# Patient Record
Sex: MALE | Race: BLACK OR AFRICAN AMERICAN | URBAN - METROPOLITAN AREA
[De-identification: names, ages, dates, MRNs, and addresses within clinical notes are randomized per-mention and may not be internally consistent; named-entity substitution may affect disease eponyms.]

---

## 2021-05-30 ENCOUNTER — EMERGENCY (EMERGENCY)
Facility: HOSPITAL | Age: 56
LOS: 1 days | Discharge: HOME | End: 2021-05-30
Attending: EMERGENCY MEDICINE
Payer: COMMERCIAL

## 2021-05-30 VITALS
SYSTOLIC BLOOD PRESSURE: 132 MMHG | HEART RATE: 115 BPM | TEMPERATURE: 99 F | RESPIRATION RATE: 18 BRPM | OXYGEN SATURATION: 96 % | HEIGHT: 67 IN | DIASTOLIC BLOOD PRESSURE: 86 MMHG | WEIGHT: 209.44 LBS

## 2021-05-30 VITALS — HEART RATE: 93 BPM

## 2021-05-30 PROCEDURE — 96372 THER/PROPH/DIAG INJ SC/IM: CPT

## 2021-05-30 PROCEDURE — 99284 EMERGENCY DEPT VISIT MOD MDM: CPT

## 2021-05-30 PROCEDURE — 99283 EMERGENCY DEPT VISIT LOW MDM: CPT | Mod: 25

## 2021-05-30 RX ORDER — FAMOTIDINE 10 MG/ML
1 INJECTION INTRAVENOUS
Qty: 28 | Refills: 0
Start: 2021-05-30 | End: 2021-06-12

## 2021-05-30 RX ORDER — FAMOTIDINE 10 MG/ML
20 INJECTION INTRAVENOUS ONCE
Refills: 0 | Status: COMPLETED | OUTPATIENT
Start: 2021-05-30 | End: 2021-05-30

## 2021-05-30 RX ORDER — DEXAMETHASONE 0.5 MG/5ML
10 ELIXIR ORAL ONCE
Refills: 0 | Status: COMPLETED | OUTPATIENT
Start: 2021-05-30 | End: 2021-05-30

## 2021-05-30 RX ORDER — DIPHENHYDRAMINE HCL 50 MG
50 CAPSULE ORAL ONCE
Refills: 0 | Status: COMPLETED | OUTPATIENT
Start: 2021-05-30 | End: 2021-05-30

## 2021-05-30 RX ORDER — DIPHENHYDRAMINE HCL 50 MG
1 CAPSULE ORAL
Qty: 28 | Refills: 0
Start: 2021-05-30 | End: 2021-06-05

## 2021-05-30 RX ADMIN — FAMOTIDINE 20 MILLIGRAM(S): 10 INJECTION INTRAVENOUS at 16:09

## 2021-05-30 RX ADMIN — Medication 10 MILLIGRAM(S): at 16:09

## 2021-05-30 RX ADMIN — Medication 50 MILLIGRAM(S): at 16:09

## 2021-05-30 NOTE — ED PROVIDER NOTE - PATIENT PORTAL LINK FT
You can access the FollowMyHealth Patient Portal offered by [facility] by registering at the following website: [email or web address]. By joining [employer]’s FollowMyHealth portal, you will also be able to view your health information using other applications (apps) compatible with our system.

## 2021-05-30 NOTE — ED PROVIDER NOTE - OBJECTIVE STATEMENT
55 year old male with no significant PMHx or PSHx presents to the ED with complaints of hives for three days after having red and green grapes. Patient reports that he noted initial improvement with taking Benadryl, however states that the hives then return. Patient denies any shortness of breath, fever, chills, wheezing, and all other acute complaints. NKDA.

## 2021-05-30 NOTE — ED ADULT NURSE NOTE - CHIEF COMPLAINT QUOTE
Pt came in the er with c/o itching ,redness .coughing since Thursday .no Tongue swelling .no sob .heart rate is 115.

## 2021-05-30 NOTE — ED ADULT TRIAGE NOTE - CHIEF COMPLAINT QUOTE
Pt came in the er with c/o itching ,redness .coughing since Thursday .no lounge swelling .no sob .hert rate is 115. Pt came in the er with c/o itching ,redness .coughing since Thursday .no Tongue swelling .no sob .heart rate is 115.

## 2021-05-30 NOTE — ED ADULT NURSE NOTE - EXTENSIONS OF SELF_ADULT
Refill Routing Note     Medication(s) are not appropriate for processing by [facility]:    Medication Outside of Protocol    Appointments  past 12m or future 3m with PCP    Date Provider   Last Visit   10/22/2019 Josh Li MD   Next Visit   4/20/2020 Josh Li MD           Automatic Epic Protocol Generated Data:    Requested Prescriptions   Pending Prescriptions Disp Refills    ALPRAZolam (XANAX) 0.25 MG tablet [Pharmacy Med Name: ALPRAZOLAM 0.25 MG TAB 0.25 TAB] 60 tablet 2     Sig: TAKE 1 TABLET BY MOUTH 3 TIMES DAILY AS NEEDED       There is no refill protocol information for this order           Note composed:8:13 AM 02/24/2020    
None

## 2021-05-30 NOTE — ED ADULT NURSE NOTE - BREATH SOUNDS, MLM
Discharge Instructions per Daniel Yoder M.D.        DIET: regular    ACTIVITY: as tolerated    DIAGNOSIS: migraine possible    Return to ER if symptoms return  Discharge Instructions    Discharged to home by car with relative. Discharged via wheelchair, hospital escort: Yes.  Special equipment needed: Not Applicable    Be sure to schedule a follow-up appointment with your primary care doctor or any specialists as instructed.     Discharge Plan:   Diet Plan: Discussed  Activity Level: Discussed  Confirmed Follow up Appointment: Appointment Scheduled  Confirmed Symptoms Management: Discussed  Medication Reconciliation Updated: Yes  Influenza Vaccine Indication: Patient Refuses    I understand that a diet low in cholesterol, fat, and sodium is recommended for good health. Unless I have been given specific instructions below for another diet, I accept this instruction as my diet prescription.   Other diet: Regular    Special Instructions: None    · Is patient discharged on Warfarin / Coumadin?   No     Migraine Headache  A migraine headache is an intense, throbbing pain on one side or both sides of the head. Migraines may also cause other symptoms, such as nausea, vomiting, and sensitivity to light and noise.  What are the causes?  Doing or taking certain things may also trigger migraines, such as:  · Alcohol.  · Smoking.  · Medicines, such as:  ¨ Medicine used to treat chest pain (nitroglycerine).  ¨ Birth control pills.  ¨ Estrogen pills.  ¨ Certain blood pressure medicines.  · Aged cheeses, chocolate, or caffeine.  · Foods or drinks that contain nitrates, glutamate, aspartame, or tyramine.  · Physical activity.  Other things that may trigger a migraine include:  · Menstruation.  · Pregnancy.  · Hunger.  · Stress, lack of sleep, too much sleep, or fatigue.  · Weather changes.  What increases the risk?  The following factors may make you more likely to experience migraine headaches:  · Age. Risk increases with  age.  · Family history of migraine headaches.  · Being .  · Depression and anxiety.  · Obesity.  · Being a woman.  · Having a hole in the heart (patent foramen ovale) or other heart problems.  What are the signs or symptoms?  The main symptom of this condition is pulsating or throbbing pain. Pain may:  · Happen in any area of the head, such as on one side or both sides.  · Interfere with daily activities.  · Get worse with physical activity.  · Get worse with exposure to bright lights or loud noises.  Other symptoms may include:  · Nausea.  · Vomiting.  · Dizziness.  · General sensitivity to bright lights, loud noises, or smells.  Before you get a migraine, you may get warning signs that a migraine is developing (aura). An aura may include:  · Seeing flashing lights or having blind spots.  · Seeing bright spots, halos, or zigzag lines.  · Having tunnel vision or blurred vision.  · Having numbness or a tingling feeling.  · Having trouble talking.  · Having muscle weakness.  How is this diagnosed?  A migraine headache can be diagnosed based on:  · Your symptoms.  · A physical exam.  · Tests, such as CT scan or MRI of the head. These imaging tests can help rule out other causes of headaches.  · Taking fluid from the spine (lumbar puncture) and analyzing it (cerebrospinal fluid analysis, or CSF analysis).  How is this treated?  A migraine headache is usually treated with medicines that:  · Relieve pain.  · Relieve nausea.  · Prevent migraines from coming back.  Treatment may also include:  · Acupuncture.  · Lifestyle changes like avoiding foods that trigger migraines.  Follow these instructions at home:  Medicines  · Take over-the-counter and prescription medicines only as told by your health care provider.  · Do not drive or use heavy machinery while taking prescription pain medicine.  · To prevent or treat constipation while you are taking prescription pain medicine, your health care provider may recommend  that you:  ¨ Drink enough fluid to keep your urine clear or pale yellow.  ¨ Take over-the-counter or prescription medicines.  ¨ Eat foods that are high in fiber, such as fresh fruits and vegetables, whole grains, and beans.  ¨ Limit foods that are high in fat and processed sugars, such as fried and sweet foods.  Lifestyle  · Avoid alcohol use.  · Do not use any products that contain nicotine or tobacco, such as cigarettes and e-cigarettes. If you need help quitting, ask your health care provider.  · Get at least 8 hours of sleep every night.  · Limit your stress.  General instructions  · Keep a journal to find out what may trigger your migraine headaches. For example, write down:  ¨ What you eat and drink.  ¨ How much sleep you get.  ¨ Any change to your diet or medicines.  · If you have a migraine:  ¨ Avoid things that make your symptoms worse, such as bright lights.  ¨ It may help to lie down in a dark, quiet room.  ¨ Do not drive or use heavy machinery.  ¨ Ask your health care provider what activities are safe for you while you are experiencing symptoms.  · Keep all follow-up visits as told by your health care provider. This is important.  Contact a health care provider if:  · You develop symptoms that are different or more severe than your usual migraine symptoms.  Get help right away if:  · Your migraine becomes severe.  · You have a fever.  · You have a stiff neck.  · You have vision loss.  · Your muscles feel weak or like you cannot control them.  · You start to lose your balance often.  · You develop trouble walking.  · You faint.  This information is not intended to replace advice given to you by your health care provider. Make sure you discuss any questions you have with your health care provider.  Document Released: 12/18/2006 Document Revised: 07/07/2017 Document Reviewed: 06/05/2017  [employer] Patient Education © 2017 [employer]      Depression / Suicide Risk    As you are discharged from  this [facility] facility, it is important to learn how to keep safe from harming yourself.    Recognize the warning signs:  · Abrupt changes in personality, positive or negative- including increase in energy   · Giving away possessions  · Change in eating patterns- significant weight changes-  positive or negative  · Change in sleeping patterns- unable to sleep or sleeping all the time   · Unwillingness or inability to communicate  · Depression  · Unusual sadness, discouragement and loneliness  · Talk of wanting to die  · Neglect of personal appearance   · Rebelliousness- reckless behavior  · Withdrawal from people/activities they love  · Confusion- inability to concentrate     If you or a loved one observes any of these behaviors or has concerns about self-harm, here's what you can do:  · Talk about it- your feelings and reasons for harming yourself  · Remove any means that you might use to hurt yourself (examples: pills, rope, extension cords, firearm)  · Get professional help from the community (Mental Health, Substance Abuse, psychological counseling)  · Do not be alone:Call your Safe Contact- someone whom you trust who will be there for you.  · Call your local CRISIS HOTLINE 080-4458 or 526-279-1953  · Call your local Children's Mobile Crisis Response Team [place] (329) 565-1773 or www.[employer]  · Call the toll free National Suicide Prevention Hotlines   · National Suicide Prevention Lifeline 154-864-MFDX (3107)  · National Hope Line Network 800-SUICIDE (399-7448)                   Clear

## 2023-05-16 NOTE — ED ADULT NURSE NOTE - SUICIDE SCREENING DEPRESSION
[Use proper foot wear] : Use proper foot wear [Engage in a relaxing activity] : Engage in a relaxing activity [None] : None [Good understanding] : Patient has a good understanding of lifestyle changes and steps needed to achieve self management goal Negative